# Patient Record
Sex: FEMALE | Race: BLACK OR AFRICAN AMERICAN | NOT HISPANIC OR LATINO | Employment: FULL TIME | URBAN - METROPOLITAN AREA
[De-identification: names, ages, dates, MRNs, and addresses within clinical notes are randomized per-mention and may not be internally consistent; named-entity substitution may affect disease eponyms.]

---

## 2018-08-13 ENCOUNTER — OFFICE VISIT (OUTPATIENT)
Dept: URGENT CARE | Facility: CLINIC | Age: 28
End: 2018-08-13
Payer: MEDICAID

## 2018-08-13 VITALS
SYSTOLIC BLOOD PRESSURE: 121 MMHG | DIASTOLIC BLOOD PRESSURE: 64 MMHG | WEIGHT: 149 LBS | HEART RATE: 90 BPM | BODY MASS INDEX: 25.44 KG/M2 | RESPIRATION RATE: 16 BRPM | OXYGEN SATURATION: 98 % | TEMPERATURE: 98 F | HEIGHT: 64 IN

## 2018-08-13 DIAGNOSIS — R30.0 DYSURIA: Primary | ICD-10-CM

## 2018-08-13 DIAGNOSIS — N30.00 ACUTE CYSTITIS WITHOUT HEMATURIA: ICD-10-CM

## 2018-08-13 LAB
B-HCG UR QL: NEGATIVE
BILIRUB UR QL STRIP: NEGATIVE
CTP QC/QA: YES
GLUCOSE UR QL STRIP: NEGATIVE
KETONES UR QL STRIP: NEGATIVE
LEUKOCYTE ESTERASE UR QL STRIP: POSITIVE
PH, POC UA: 5.5 (ref 5–8)
POC BLOOD, URINE: POSITIVE
POC NITRATES, URINE: NEGATIVE
PROT UR QL STRIP: POSITIVE
SP GR UR STRIP: 1.02 (ref 1–1.03)
UROBILINOGEN UR STRIP-ACNC: NORMAL (ref 0.1–1.1)

## 2018-08-13 PROCEDURE — 99203 OFFICE O/P NEW LOW 30 MIN: CPT | Mod: 25,S$GLB,, | Performed by: NURSE PRACTITIONER

## 2018-08-13 PROCEDURE — 81003 URINALYSIS AUTO W/O SCOPE: CPT | Mod: QW,S$GLB,, | Performed by: NURSE PRACTITIONER

## 2018-08-13 PROCEDURE — 81025 URINE PREGNANCY TEST: CPT | Mod: S$GLB,,, | Performed by: NURSE PRACTITIONER

## 2018-08-13 RX ORDER — NITROFURANTOIN 25; 75 MG/1; MG/1
100 CAPSULE ORAL 2 TIMES DAILY
Qty: 10 CAPSULE | Refills: 0 | Status: SHIPPED | OUTPATIENT
Start: 2018-08-13 | End: 2018-08-18

## 2018-08-13 RX ORDER — PHENAZOPYRIDINE HYDROCHLORIDE 100 MG/1
100 TABLET, FILM COATED ORAL 3 TIMES DAILY PRN
Qty: 20 TABLET | Refills: 0 | Status: SHIPPED | OUTPATIENT
Start: 2018-08-13 | End: 2019-08-13

## 2018-08-13 NOTE — PROGRESS NOTES
"Subjective:       Patient ID: Lorin Craig is a 27 y.o. female.    Vitals:  height is 5' 4" (1.626 m) and weight is 67.6 kg (149 lb). Her oral temperature is 97.6 °F (36.4 °C). Her blood pressure is 121/64 and her pulse is 90. Her respiration is 16 and oxygen saturation is 98%.     Chief Complaint: Dysuria    Patient states her symptoms started on 8/7/2018.  Hx of uti but has not has not had one for years         Dysuria    This is a new problem. The current episode started in the past 7 days. The problem occurs every urination. The problem has been gradually worsening. The quality of the pain is described as burning and aching. The pain is at a severity of 9/10. The pain is severe. There has been no fever. She is sexually active. There is no history of pyelonephritis. Associated symptoms include frequency, nausea and urgency. Pertinent negatives include no chills, hematuria or vomiting. She has tried nothing for the symptoms. There is no history of kidney stones or recurrent UTIs.     Review of Systems   Constitution: Negative for chills and fever.   Skin: Negative for itching.   Musculoskeletal: Negative for back pain.   Gastrointestinal: Positive for abdominal pain and nausea. Negative for vomiting.   Genitourinary: Positive for dysuria, frequency, incomplete emptying and urgency. Negative for genital sores, hematuria, missed menses and non-menstrual bleeding.       Objective:      Physical Exam   Constitutional: She is oriented to person, place, and time. She appears well-developed and well-nourished.   HENT:   Head: Normocephalic and atraumatic.   Right Ear: External ear normal.   Left Ear: External ear normal.   Nose: Nose normal.   Eyes: Lids are normal.   Neck: Trachea normal, normal range of motion and phonation normal. Neck supple.   Cardiovascular: Normal pulses.   Pulmonary/Chest: Effort normal.   Abdominal: Soft. Normal appearance and bowel sounds are normal. She exhibits no distension. There is no " tenderness. There is no CVA tenderness.   Neurological: She is alert and oriented to person, place, and time.   Skin: Skin is warm, dry and intact.   Psychiatric: She has a normal mood and affect. Her speech is normal and behavior is normal. Cognition and memory are normal.   Nursing note and vitals reviewed.      Assessment:       1. Dysuria    2. Acute cystitis without hematuria        Plan:         Dysuria  -     POCT Urinalysis, Dipstick, Automated, W/O Scope  -     POCT urine pregnancy    Acute cystitis without hematuria    Other orders  -     nitrofurantoin, macrocrystal-monohydrate, (MACROBID) 100 MG capsule; Take 1 capsule (100 mg total) by mouth 2 (two) times daily. for 5 days  Dispense: 10 capsule; Refill: 0  -     phenazopyridine (PYRIDIUM) 100 MG tablet; Take 1 tablet (100 mg total) by mouth 3 (three) times daily as needed for Pain.  Dispense: 20 tablet; Refill: 0      Patient Instructions   UTI Relief   · Increase water intake, decrease sodas, tea, coffee, energy drinks. Cranberry products are thought to protect against UTI by inhibiting bacterial growth and adhesion to the bladder.   · Tylenol (acetaminophen) and/or NSAIDS (motrin, ibuprofen)   · Timed voiding every 2-3 hours ( void every 2-3 hours even if you do not feel the urge)  · OTC AZO/pyridium if symptoms are persistent - this will turn your urine orange.  · Sitting in the tub of warm water 20-30 minutes 3-4x/day can help with symptom relief   · Avoid tight fitting cloths, douching, tampon use  · Wipe front to back   · Void prior to and after intercourse   · Use probiotics especially with any antibiotic therapy

## 2018-08-14 NOTE — PATIENT INSTRUCTIONS
UTI Relief   · Increase water intake, decrease sodas, tea, coffee, energy drinks. Cranberry products are thought to protect against UTI by inhibiting bacterial growth and adhesion to the bladder.   · Tylenol (acetaminophen) and/or NSAIDS (motrin, ibuprofen)   · Timed voiding every 2-3 hours ( void every 2-3 hours even if you do not feel the urge)  · OTC AZO/pyridium if symptoms are persistent - this will turn your urine orange.  · Sitting in the tub of warm water 20-30 minutes 3-4x/day can help with symptom relief   · Avoid tight fitting cloths, douching, tampon use  · Wipe front to back   · Void prior to and after intercourse   · Use probiotics especially with any antibiotic therapy

## 2018-08-16 ENCOUNTER — TELEPHONE (OUTPATIENT)
Dept: URGENT CARE | Facility: CLINIC | Age: 28
End: 2018-08-16

## 2018-08-20 ENCOUNTER — OFFICE VISIT (OUTPATIENT)
Dept: URGENT CARE | Facility: CLINIC | Age: 28
End: 2018-08-20
Payer: MEDICAID

## 2018-08-20 VITALS
SYSTOLIC BLOOD PRESSURE: 119 MMHG | DIASTOLIC BLOOD PRESSURE: 73 MMHG | TEMPERATURE: 100 F | BODY MASS INDEX: 25.44 KG/M2 | OXYGEN SATURATION: 99 % | WEIGHT: 149 LBS | RESPIRATION RATE: 16 BRPM | HEART RATE: 97 BPM | HEIGHT: 64 IN

## 2018-08-20 DIAGNOSIS — I88.9 CERVICAL ADENITIS: ICD-10-CM

## 2018-08-20 DIAGNOSIS — H65.191 OTHER ACUTE NONSUPPURATIVE OTITIS MEDIA OF RIGHT EAR, RECURRENCE NOT SPECIFIED: Primary | ICD-10-CM

## 2018-08-20 PROCEDURE — 99214 OFFICE O/P EST MOD 30 MIN: CPT | Mod: S$GLB,,, | Performed by: FAMILY MEDICINE

## 2018-08-20 RX ORDER — PSEUDOEPHEDRINE HCL 120 MG/1
120 TABLET, FILM COATED, EXTENDED RELEASE ORAL 2 TIMES DAILY
Qty: 20 TABLET | Refills: 0 | Status: SHIPPED | OUTPATIENT
Start: 2018-08-20 | End: 2018-08-30

## 2018-08-20 RX ORDER — KETOROLAC TROMETHAMINE 30 MG/ML
30 INJECTION, SOLUTION INTRAMUSCULAR; INTRAVENOUS
Status: COMPLETED | OUTPATIENT
Start: 2018-08-20 | End: 2018-08-20

## 2018-08-20 RX ORDER — AMOXICILLIN AND CLAVULANATE POTASSIUM 875; 125 MG/1; MG/1
1 TABLET, FILM COATED ORAL 2 TIMES DAILY
Qty: 20 TABLET | Refills: 0 | Status: SHIPPED | OUTPATIENT
Start: 2018-08-20 | End: 2020-03-25 | Stop reason: CLARIF

## 2018-08-20 RX ADMIN — KETOROLAC TROMETHAMINE 30 MG: 30 INJECTION, SOLUTION INTRAMUSCULAR; INTRAVENOUS at 06:08

## 2018-08-20 NOTE — PROGRESS NOTES
"Subjective:       Patient ID: Lorin Craig is a 27 y.o. female.    Vitals:  height is 5' 4" (1.626 m) and weight is 67.6 kg (149 lb). Her oral temperature is 100.3 °F (37.9 °C). Her blood pressure is 119/73 and her pulse is 97. Her respiration is 16 and oxygen saturation is 99%.     Chief Complaint: Jaw Pain (lower jaw)    Patient states her symptoms started on 08/17/2018. Patient states she is here because she is having lowe jaw pain. Patient states her pain score is a 10/10. Patient states she took Advil for the pain but it is not helping. Patient states her throat is not hurting is not hurting. Patient states she is having some ear pain. Patient states she does not have HX of TMJ. Patient states it hurts to move her jaw or to eat.      Review of Systems   Constitution: Negative for chills and fever.   HENT: Positive for sore throat.    Eyes: Negative for blurred vision.   Cardiovascular: Negative for chest pain.   Respiratory: Negative for shortness of breath.    Skin: Negative for rash.   Musculoskeletal: Negative for back pain and joint pain.   Gastrointestinal: Negative for abdominal pain, diarrhea, nausea and vomiting.   Neurological: Negative for headaches.   Psychiatric/Behavioral: The patient is not nervous/anxious.        Objective:      Physical Exam   Constitutional: She is oriented to person, place, and time. She appears well-developed and well-nourished.   HENT:   Head: Normocephalic and atraumatic. Head is without abrasion, without contusion and without laceration.   Right Ear: External ear normal. Tympanic membrane is erythematous.   Left Ear: External ear normal. A middle ear effusion is present.   Nose: Right sinus exhibits maxillary sinus tenderness. Right sinus exhibits no frontal sinus tenderness. Left sinus exhibits maxillary sinus tenderness. Left sinus exhibits no frontal sinus tenderness.   Mouth/Throat: Posterior oropharyngeal erythema present.   Eyes: Conjunctivae and lids are normal. " Pupils are equal, round, and reactive to light.   Neck: Trachea normal, full passive range of motion without pain and phonation normal. Neck supple.   Cardiovascular: Normal rate, regular rhythm and normal heart sounds.   Pulmonary/Chest: Effort normal and breath sounds normal. No stridor. No respiratory distress. She has no decreased breath sounds. She has no wheezes. She has no rhonchi. She has no rales.   Musculoskeletal: Normal range of motion.   Lymphadenopathy:        Head (right side): Submandibular and posterior auricular adenopathy present. No submental adenopathy present.        Head (left side): Submandibular adenopathy present. No submental adenopathy present.     She has cervical adenopathy.        Right cervical: Superficial cervical adenopathy present.        Left cervical: Superficial cervical adenopathy present.   Patient has diffuse lymph node swelling and tenderness right side greater than left in cervical and submandibular areas.     Neurological: She is alert and oriented to person, place, and time.   Skin: Skin is warm, dry and intact. Capillary refill takes less than 2 seconds. No abrasion, no bruising, no burn, no ecchymosis, no laceration, no lesion and no rash noted. No erythema.   Psychiatric: She has a normal mood and affect. Her speech is normal and behavior is normal. Judgment and thought content normal. Cognition and memory are normal.   Nursing note and vitals reviewed.      Assessment:       1. Other acute nonsuppurative otitis media of right ear, recurrence not specified    2. Cervical adenitis        Plan:     Advised to use heating pad to her neck and under her chin.       Other acute nonsuppurative otitis media of right ear, recurrence not specified    Cervical adenitis    Other orders  -     ketorolac injection 30 mg; Inject 1 mL (30 mg total) into the muscle one time.  -     pseudoephedrine (SUDAFED 12 HOUR) 120 mg 12 hr tablet; Take 1 tablet (120 mg total) by mouth 2 (two)  times daily. for 10 days  Dispense: 20 tablet; Refill: 0  -     amoxicillin-clavulanate 875-125mg (AUGMENTIN) 875-125 mg per tablet; Take 1 tablet by mouth 2 (two) times daily.  Dispense: 20 tablet; Refill: 0        Follow Up Comments   Make sure that you follow up with your primary care doctor in the next 2-5 days if needed .  Return to the Urgent Care if signs or symptoms change and certainly if you have worsening symptoms go to the nearest emergency department for further evaluation.

## 2018-08-20 NOTE — PATIENT INSTRUCTIONS
Local Lymph Node Infection, Antibiotic Treatment    You have a bacterial infection of a lymph node. The lymph nodes are part of your immune system. They are found under the jaw and along the side of the neck, in the armpits, and in the groin. An infection or inflammation in nearby tissues causes the lymph nodes to swell and become tender.  When a bacterial infection occurs in the lymph node, it becomes very painful. The nearby skin gets red and warm. You may also have a fever.  Antibiotics and hot compresses are used to treat this infection. The pain and redness will get better over the next 7 to 10 days. Swelling may take several months to go away.  Sometimes an abscess (with pus) forms inside the lymph node. If this happens, antibiotics may not be enough to cure the infection. Minor surgery may be needed to drain the pus.  Home care  Follow these guidelines when caring for yourself at home:  · Take all of the antibiotic medicine exactly as prescribed until it is gone. Be careful not to miss any doses, especially during the first few days.  · Make a hot compress by running hot water over a face cloth. Apply it to the sore area until the cloth cools off. Repeat this for 20 minutes. Use the hot compress 3 times a day for the first 3 days, or until the pain and redness begin to get better. The heat will increase the blood flow to the area and speed the healing process.  · You may use acetaminophen or ibuprofen to control pain and fever, unless another medicine was prescribed for this. Dont use ibuprofen in children under 6 months of age. If you have chronic liver or kidney disease, talk with your healthcare provider before using these medicines. Also talk with your provider if youve had a stomach ulcer or GI bleeding. Dont give aspirin to anyone under 18 years of age who is ill with a fever. It may cause severe liver damage.  Follow-up care  Follow up with your healthcare provider, or as advised, after you finish  the antibiotics.  When to seek medical advice  Call your healthcare provider right away if any of these occur:  · Redness, swelling or pain in the lymph node gets worse  · The lymph node gets bigger, becomes soft in the middle, or doesnt seem to be getting better after youve taken the antibiotics for 2 days (48 hours)  · Pus or fluid drains from the lymph node  · You have difficulty breathing or swallowing  Also call your provider right away if you have a fever, or your childs provider if:  · Your child is younger than 12 weeks and has a fever of 100.4°F (38°C) or higher. Your baby may need to be seen by his or her healthcare provider.  · Your child is of any age and has repeated fevers above 104°F (40°C).  · Your child is younger than 2 years old and his or her fever continues for more than 24 hours. Or your child is 2 years old or older and his or her fever continues for more than 3 days.  Date Last Reviewed: 2/17/2015 © 2000-2017 [employer]. [address] [place] [address]. All rights reserved. This information is not intended as a substitute for professional medical care. Always follow your healthcare professional's instructions.        Middle Ear Infection (Adult)  You have an infection of the middle ear, the space behind the eardrum. This is also called acute otitis media (AOM). Sometimes it is caused by the common cold. This is because congestion can block the internal passage (eustachian tube) that drains fluid from the middle ear. When the middle ear fills with fluid, bacteria can grow there and cause an infection. Oral antibiotics are used to treat this illness, not ear drops. Symptoms usually start to improve within 1 to 2 days of treatment.    Home care  The following are general care guidelines:  · Finish all of the antibiotic medicine given, even though you may feel better after the first few days.  · You may use over-the-counter medicine, such as acetaminophen or  ibuprofen, to control pain and fever, unless something else was prescribed. If you have chronic liver or kidney disease or have ever had a stomach ulcer or gastrointestinal bleeding, talk with your healthcare provider before using these medicines. Do not give aspirin to anyone under 18 years of age who has a fever. It may cause severe illness or death.  Follow-up care  Follow up with your healthcare provider, or as advised, in 2 weeks if all symptoms have not gotten better, or if hearing doesn't go back to normal within 1 month.  When to seek medical advice  Call your healthcare provider right away if any of these occur:  · Ear pain gets worse or does not improve after 3 days of treatment  · Unusual drowsiness or confusion  · Neck pain, stiff neck, or headache  · Fluid or blood draining from the ear canal  · Fever of 100.4°F (38°C) or as advised   · Seizure  Date Last Reviewed: 6/1/2016 © 2000-2017 [employer]. [address] [place] [address]. All rights reserved. This information is not intended as a substitute for professional medical care. Always follow your healthcare professional's instructions.      Lorin was seen today for jaw pain.    Diagnoses and all orders for this visit:    Other acute nonsuppurative otitis media of right ear, recurrence not specified    Cervical adenitis    Other orders  -     ketorolac injection 30 mg; Inject 1 mL (30 mg total) into the muscle one time.  -     pseudoephedrine (SUDAFED 12 HOUR) 120 mg 12 hr tablet; Take 1 tablet (120 mg total) by mouth 2 (two) times daily. for 10 days  -     amoxicillin-clavulanate 875-125mg (AUGMENTIN) 875-125 mg per tablet; Take 1 tablet by mouth 2 (two) times daily.            Follow Up Comments   Make sure that you follow up with your primary care doctor in the next 2-5 days if needed .  Return to the Urgent Care if signs or symptoms change and certainly if you have worsening symptoms go to the nearest emergency department  for further evaluation.     Verónica Meeks MD

## 2018-08-30 ENCOUNTER — OFFICE VISIT (OUTPATIENT)
Dept: URGENT CARE | Facility: CLINIC | Age: 28
End: 2018-08-30
Payer: MEDICAID

## 2018-08-30 VITALS
DIASTOLIC BLOOD PRESSURE: 80 MMHG | SYSTOLIC BLOOD PRESSURE: 118 MMHG | HEART RATE: 84 BPM | WEIGHT: 149 LBS | TEMPERATURE: 98 F | RESPIRATION RATE: 18 BRPM | BODY MASS INDEX: 25.44 KG/M2 | OXYGEN SATURATION: 98 % | HEIGHT: 64 IN

## 2018-08-30 DIAGNOSIS — H57.89 REDNESS OF BOTH EYES: Primary | ICD-10-CM

## 2018-08-30 PROCEDURE — 99213 OFFICE O/P EST LOW 20 MIN: CPT | Mod: S$GLB,,, | Performed by: FAMILY MEDICINE

## 2018-08-30 RX ORDER — PREDNISOLONE ACETATE 10 MG/ML
1 SUSPENSION/ DROPS OPHTHALMIC 4 TIMES DAILY
Qty: 1 BOTTLE | Refills: 0 | Status: SHIPPED | OUTPATIENT
Start: 2018-08-30 | End: 2018-09-09

## 2018-08-30 NOTE — PROGRESS NOTES
"Subjective:       Patient ID: Lorin Craig is a 27 y.o. female.    Vitals:  height is 5' 4" (1.626 m) and weight is 67.6 kg (149 lb). Her temperature is 98.4 °F (36.9 °C). Her blood pressure is 118/80 and her pulse is 84. Her respiration is 18 and oxygen saturation is 98%.     Chief Complaint: Eye Problem    C/o itchy and red eyes, recalls removing make up of eye using vaseline, no FB   no hx of contact lenses. Denies any other allergy SX      Eye Problem    Both eyes are affected.This is a new problem. The current episode started in the past 7 days. The problem occurs constantly. The problem has been unchanged. There was no injury mechanism. The pain is at a severity of 1/10. The pain is mild. There is no known exposure to pink eye. She does not wear contacts. Associated symptoms include eye redness. Pertinent negatives include no blurred vision, fever, nausea, photophobia or vomiting. She has tried eye drops for the symptoms. The treatment provided no relief.     Review of Systems   Constitution: Negative for chills and fever.   HENT: Negative for congestion.    Eyes: Positive for pain and redness. Negative for blurred vision and photophobia.   Gastrointestinal: Negative for nausea and vomiting.   Neurological: Negative for headaches.       Objective:      Physical Exam   Constitutional: She is oriented to person, place, and time. She appears well-developed and well-nourished. She is cooperative.  Non-toxic appearance. She does not appear ill. No distress.   HENT:   Head: Normocephalic and atraumatic.   Right Ear: Hearing, tympanic membrane, external ear and ear canal normal.   Left Ear: Hearing, tympanic membrane, external ear and ear canal normal.   Nose: Nose normal. No mucosal edema, rhinorrhea or nasal deformity. No epistaxis. Right sinus exhibits no maxillary sinus tenderness and no frontal sinus tenderness. Left sinus exhibits no maxillary sinus tenderness and no frontal sinus tenderness. "   Mouth/Throat: Uvula is midline, oropharynx is clear and moist and mucous membranes are normal. No trismus in the jaw. Normal dentition. No uvula swelling. No oropharyngeal exudate or posterior oropharyngeal erythema.   Eyes: Conjunctivae and lids are normal. Right eye exhibits no discharge. Left eye exhibits no discharge. No scleral icterus.   Conjunctivae erythematous bilaterally, no swelling  No FB   Neck: Trachea normal, normal range of motion, full passive range of motion without pain and phonation normal. Neck supple.   Cardiovascular: Normal rate, regular rhythm, normal heart sounds, intact distal pulses and normal pulses.   Pulmonary/Chest: Effort normal and breath sounds normal. No respiratory distress.   Abdominal: Soft. Normal appearance and bowel sounds are normal. She exhibits no distension, no pulsatile midline mass and no mass. There is no tenderness.   Musculoskeletal: Normal range of motion. She exhibits no edema or deformity.   Neurological: She is alert and oriented to person, place, and time. She exhibits normal muscle tone. Coordination normal.   Skin: Skin is warm, dry and intact. She is not diaphoretic. No pallor.   Psychiatric: She has a normal mood and affect. Her speech is normal and behavior is normal. Judgment and thought content normal. Cognition and memory are normal.   Nursing note and vitals reviewed.      Assessment:       No diagnosis found.    Plan:         There are no diagnoses linked to this encounter.

## 2019-06-04 ENCOUNTER — TELEPHONE (OUTPATIENT)
Dept: OTOLARYNGOLOGY | Facility: CLINIC | Age: 29
End: 2019-06-04

## 2019-06-07 ENCOUNTER — OFFICE VISIT (OUTPATIENT)
Dept: OTOLARYNGOLOGY | Facility: CLINIC | Age: 29
End: 2019-06-07
Payer: MEDICAID

## 2019-06-07 VITALS
DIASTOLIC BLOOD PRESSURE: 68 MMHG | WEIGHT: 147.69 LBS | BODY MASS INDEX: 25.35 KG/M2 | HEART RATE: 69 BPM | SYSTOLIC BLOOD PRESSURE: 104 MMHG

## 2019-06-07 DIAGNOSIS — H69.93 DYSFUNCTION OF BOTH EUSTACHIAN TUBES: Primary | ICD-10-CM

## 2019-06-07 PROCEDURE — 99202 PR OFFICE/OUTPT VISIT, NEW, LEVL II, 15-29 MIN: ICD-10-PCS | Mod: S$PBB,,, | Performed by: NURSE PRACTITIONER

## 2019-06-07 PROCEDURE — 99999 PR PBB SHADOW E&M-EST. PATIENT-LVL II: CPT | Mod: PBBFAC,,, | Performed by: NURSE PRACTITIONER

## 2019-06-07 PROCEDURE — 99999 PR PBB SHADOW E&M-EST. PATIENT-LVL II: ICD-10-PCS | Mod: PBBFAC,,, | Performed by: NURSE PRACTITIONER

## 2019-06-07 PROCEDURE — 99212 OFFICE O/P EST SF 10 MIN: CPT | Mod: PBBFAC | Performed by: NURSE PRACTITIONER

## 2019-06-07 PROCEDURE — 99202 OFFICE O/P NEW SF 15 MIN: CPT | Mod: S$PBB,,, | Performed by: NURSE PRACTITIONER

## 2019-06-07 RX ORDER — FLUTICASONE PROPIONATE 50 MCG
2 SPRAY, SUSPENSION (ML) NASAL DAILY
Qty: 1 BOTTLE | Refills: 5 | Status: SHIPPED | OUTPATIENT
Start: 2019-06-07 | End: 2019-07-07

## 2019-06-07 NOTE — PROGRESS NOTES
Subjective:      Lorin Craig is a 28 y.o. female who was self-referred for ear pain.    Ms. Craig presents to clinic today with intermittent ear pain that fluctuates between both ears for the past several months. Associated symptoms includes ear pressure that comes and goes. She denies any ear drainage, hearing change, nasal congestion, rhinorrhea, sinus pressure, itchy eyes or nose or sneezing. She denies any history of allergy. She has not tried any medication. She denies any ear or sinus surgery or history of ear infections.      Past Medical History  She has a past medical history of Asthma.    Past Surgical History  She has a past surgical history that includes cyst neck.    Family History  Her family history includes Breast cancer (age of onset: 70) in her paternal grandmother; Diabetes in her maternal grandfather; No Known Problems in her brother, brother, father, mother, sister, and sister.    Social History  She reports that she has never smoked. She has never used smokeless tobacco. She reports that she drinks alcohol. She reports that she does not use drugs.    Allergies  She is allergic to no known drug allergies.    Medications  She has a current medication list which includes the following prescription(s): amoxicillin-clavulanate 875-125mg, fluticasone propionate, l norgest/e.estradiol-e.estrad, and phenazopyridine.    Review of Systems   Constitutional: Negative for chills, fatigue and fever.   HENT: Positive for ear pain. Negative for congestion, facial swelling, nosebleeds, postnasal drip, rhinorrhea, sinus pressure, sinus pain, sneezing, sore throat and tinnitus.    Eyes: Negative for photophobia, redness, itching and visual disturbance.   Respiratory: Negative for apnea, cough, shortness of breath, wheezing and stridor.    Cardiovascular: Negative for chest pain and palpitations.   Gastrointestinal: Negative for diarrhea, nausea and vomiting.   Endocrine: Negative.    Genitourinary:  Negative for decreased urine volume, dysuria and frequency.   Musculoskeletal: Negative for arthralgias, myalgias and neck stiffness.   Skin: Negative for rash and wound.   Allergic/Immunologic: Negative for environmental allergies, food allergies and immunocompromised state.   Neurological: Negative for dizziness, syncope, weakness, light-headedness and headaches.   Hematological: Negative for adenopathy. Does not bruise/bleed easily.   Psychiatric/Behavioral: Negative for confusion, decreased concentration and sleep disturbance.          Objective:     /68   Pulse 69   Wt 67 kg (147 lb 11.3 oz)   BMI 25.35 kg/m²      Constitutional:   She is oriented to person, place, and time. Vital signs are normal. She appears well-developed and well-nourished. She appears alert. Normal speech.      Head:  Normocephalic and atraumatic.     Ears:    Right Ear: No lacerations. No drainage, swelling or tenderness. No foreign bodies. No mastoid tenderness. Tympanic membrane is not injected, not scarred, not perforated, not erythematous, not retracted and not bulging. Tympanic membrane mobility is normal. No middle ear effusion. No hemotympanum.   Left Ear: No lacerations. No drainage, swelling or tenderness. No foreign bodies. No mastoid tenderness. Tympanic membrane is perforated. Tympanic membrane is not injected, not scarred, not erythematous, not retracted and not bulging. Tympanic membrane mobility is normal.  No middle ear effusion. No hemotympanum.     Nose:  No mucosal edema, rhinorrhea, nose lacerations, sinus tenderness, septal deviation, nasal septal hematoma or polyps. No epistaxis.  No foreign bodies. Right sinus exhibits no maxillary sinus tenderness and no frontal sinus tenderness. Left sinus exhibits no maxillary sinus tenderness and no frontal sinus tenderness.     Mouth/Throat  Oropharynx clear and moist without lesions or asymmetry, normal uvula midline and lips, teeth, and gums normal. No uvula  swelling, oral lesions, trismus, mucous membrane lesions or xerostomia. No oropharyngeal exudate, posterior oropharyngeal edema or posterior oropharyngeal erythema.     Neck:  Neck normal without thyromegaly masses, asymmetry, normal tracheal structure, crepitus, and tenderness and no adenopathy.     Cardiovascular:   Normal heart sounds and normal pulses.      Pulmonary/Chest:   Effort normal and breath sounds normal.     Psychiatric:   She has a normal mood and affect. Her speech is normal and behavior is normal.     Neurological:   She is alert and oriented to person, place, and time. No cranial nerve deficit.     Skin:   No abrasions, lacerations, lesions, or rashes.       Procedure    None      Data Reviewed    WBC (K/uL)   Date Value   08/03/2010 12.2 (H)     Platelets (K/uL)   Date Value   08/03/2010 145 (L)      No results found for: CREATININE  No results found for: TSH  No results found for: GLU  No results found for: HGBA1C       Assessment:     1. Dysfunction of both eustachian tubes         Plan:     I had a long discussion with the patient regarding her condition and the further workup and management options.    Lorin was seen today for cerumen impaction and otalgia.    Diagnoses and all orders for this visit:    Dysfunction of both eustachian tubes  -     fluticasone propionate (FLONASE) 50 mcg/actuation nasal spray; 2 sprays (100 mcg total) by Each Nare route once daily.    RTC if symptoms do not improve in a month.

## 2019-06-21 ENCOUNTER — TELEPHONE (OUTPATIENT)
Dept: OTOLARYNGOLOGY | Facility: CLINIC | Age: 29
End: 2019-06-21

## 2019-06-21 NOTE — TELEPHONE ENCOUNTER
----- Message from Roxana Jean Baptiste sent at 6/21/2019  9:17 AM CDT -----  Contact: pt   Needs Advice    Reason for call: Pt prescription was sent to the wrong pharmacy. Pt would like prescription sent to  [facility]/pharmacy #[id number] - [place] - [address][phone] (Phone)  [phone] (Fax). Pt does not live on the [facility].        Communication Preference:Please give pt a call to confirm that her prescription was sent to [facility] on [address] at [phone]      Additional Information:fluticasone propionate (FLONASE) 50 mcg/actuation nasal spray 1 Bottle 5 6/7/2019 7/7/2019   Sig - Route: 2 sprays (100 mcg total) by Each Nare route once daily. - Each Nare   Sent to pharmacy as: fluticasone propionate (FLONASE) 50 mcg/actuation nasal spray   E-Prescribing Status: Receipt confirmed by pharmacy (6/7/2019  2:28 PM CDT)     Original prescription was sent to [id number] [place] - [address] [phone] (Phone)  [phone] (Fax)

## 2019-10-04 ENCOUNTER — OCCUPATIONAL HEALTH (OUTPATIENT)
Dept: URGENT CARE | Facility: CLINIC | Age: 29
End: 2019-10-04

## 2019-10-04 DIAGNOSIS — Z02.1 PHYSICAL EXAM, PRE-EMPLOYMENT: Primary | ICD-10-CM

## 2019-10-04 LAB — POC BREATH ALCOHOL: NEGATIVE

## 2019-10-04 PROCEDURE — 80305 OOH NON-DOT DRUG SCREEN: ICD-10-PCS | Mod: S$GLB,,, | Performed by: PHYSICIAN ASSISTANT

## 2019-10-04 PROCEDURE — 80305 DRUG TEST PRSMV DIR OPT OBS: CPT | Mod: S$GLB,,, | Performed by: PHYSICIAN ASSISTANT

## 2019-10-04 PROCEDURE — 82075 POCT ALCOHOL BREATH TEST: ICD-10-PCS | Mod: S$GLB,,, | Performed by: PHYSICIAN ASSISTANT

## 2019-10-04 PROCEDURE — 82075 ASSAY OF BREATH ETHANOL: CPT | Mod: S$GLB,,, | Performed by: PHYSICIAN ASSISTANT

## 2019-10-04 PROCEDURE — 99499 UNLISTED E&M SERVICE: CPT | Mod: S$GLB,,, | Performed by: PHYSICIAN ASSISTANT

## 2019-10-04 PROCEDURE — 99499 PHYSICAL, BASIC COMPLEXITY: ICD-10-PCS | Mod: S$GLB,,, | Performed by: PHYSICIAN ASSISTANT

## 2020-03-25 ENCOUNTER — HOSPITAL ENCOUNTER (EMERGENCY)
Facility: HOSPITAL | Age: 30
Discharge: HOME | End: 2020-03-25
Attending: EMERGENCY MEDICINE
Payer: MEDICAID

## 2020-03-25 VITALS
WEIGHT: 146 LBS | HEART RATE: 84 BPM | SYSTOLIC BLOOD PRESSURE: 103 MMHG | TEMPERATURE: 100 F | HEIGHT: 64 IN | RESPIRATION RATE: 16 BRPM | BODY MASS INDEX: 24.92 KG/M2 | OXYGEN SATURATION: 100 % | DIASTOLIC BLOOD PRESSURE: 59 MMHG

## 2020-03-25 DIAGNOSIS — J98.01 BRONCHOSPASM, ACUTE: ICD-10-CM

## 2020-03-25 DIAGNOSIS — R07.9 CHEST PAIN: ICD-10-CM

## 2020-03-25 DIAGNOSIS — N30.01 ACUTE CYSTITIS WITH HEMATURIA: ICD-10-CM

## 2020-03-25 DIAGNOSIS — J06.9 VIRAL URI: ICD-10-CM

## 2020-03-25 DIAGNOSIS — R05.9 COUGH: ICD-10-CM

## 2020-03-25 DIAGNOSIS — Z20.822 SUSPECTED COVID-19 VIRUS INFECTION: Primary | ICD-10-CM

## 2020-03-25 DIAGNOSIS — R50.9 FEVER, UNSPECIFIED FEVER CAUSE: ICD-10-CM

## 2020-03-25 LAB
B-HCG UR QL: NEGATIVE
BACTERIA #/AREA URNS AUTO: ABNORMAL /HPF
BILIRUB UR QL STRIP: NEGATIVE
CLARITY UR REFRACT.AUTO: CLEAR
COLOR UR AUTO: ABNORMAL
CTP QC/QA: YES
CTP QC/QA: YES
GLUCOSE UR QL STRIP: NEGATIVE
HGB UR QL STRIP: NEGATIVE
KETONES UR QL STRIP: NEGATIVE
LEUKOCYTE ESTERASE UR QL STRIP: ABNORMAL
MICROSCOPIC COMMENT: ABNORMAL
NITRITE UR QL STRIP: NEGATIVE
PH UR STRIP: 7 [PH] (ref 5–8)
POC MOLECULAR INFLUENZA A AGN: NEGATIVE
POC MOLECULAR INFLUENZA B AGN: NEGATIVE
PROT UR QL STRIP: NEGATIVE
RBC #/AREA URNS AUTO: 1 /HPF (ref 0–4)
SP GR UR STRIP: 1 (ref 1–1.03)
SQUAMOUS #/AREA URNS AUTO: 2 /HPF
URN SPEC COLLECT METH UR: ABNORMAL
WBC #/AREA URNS AUTO: 7 /HPF (ref 0–5)

## 2020-03-25 PROCEDURE — 81001 URINALYSIS AUTO W/SCOPE: CPT

## 2020-03-25 PROCEDURE — 99285 EMERGENCY DEPT VISIT HI MDM: CPT | Mod: ,,, | Performed by: EMERGENCY MEDICINE

## 2020-03-25 PROCEDURE — 99284 EMERGENCY DEPT VISIT MOD MDM: CPT | Mod: 25

## 2020-03-25 PROCEDURE — 87502 INFLUENZA DNA AMP PROBE: CPT

## 2020-03-25 PROCEDURE — 99285 PR EMERGENCY DEPT VISIT,LEVEL V: ICD-10-PCS | Mod: ,,, | Performed by: EMERGENCY MEDICINE

## 2020-03-25 PROCEDURE — 93005 ELECTROCARDIOGRAM TRACING: CPT

## 2020-03-25 PROCEDURE — 81025 URINE PREGNANCY TEST: CPT | Performed by: EMERGENCY MEDICINE

## 2020-03-25 RX ORDER — ALBUTEROL SULFATE 90 UG/1
2 AEROSOL, METERED RESPIRATORY (INHALATION) EVERY 6 HOURS PRN
Qty: 18 G | Refills: 0 | Status: SHIPPED | OUTPATIENT
Start: 2020-03-25 | End: 2021-03-25

## 2020-03-25 RX ORDER — BENZONATATE 100 MG/1
200 CAPSULE ORAL 3 TIMES DAILY PRN
Qty: 30 CAPSULE | Refills: 1 | Status: SHIPPED | OUTPATIENT
Start: 2020-03-25 | End: 2020-04-08

## 2020-03-25 RX ORDER — IBUPROFEN 400 MG/1
800 TABLET ORAL
Status: DISCONTINUED | OUTPATIENT
Start: 2020-03-25 | End: 2020-03-25 | Stop reason: HOSPADM

## 2020-03-25 RX ORDER — KETOROLAC TROMETHAMINE 30 MG/ML
15 INJECTION, SOLUTION INTRAMUSCULAR; INTRAVENOUS
Status: DISCONTINUED | OUTPATIENT
Start: 2020-03-25 | End: 2020-03-25

## 2020-03-25 RX ORDER — DOXYCYCLINE 100 MG/1
100 CAPSULE ORAL 2 TIMES DAILY
Qty: 14 CAPSULE | Refills: 0 | Status: SHIPPED | OUTPATIENT
Start: 2020-03-25 | End: 2020-04-01

## 2020-03-25 RX ORDER — KETOROLAC TROMETHAMINE 30 MG/ML
30 INJECTION, SOLUTION INTRAMUSCULAR; INTRAVENOUS
Status: DISCONTINUED | OUTPATIENT
Start: 2020-03-25 | End: 2020-03-25

## 2020-03-25 NOTE — DISCHARGE INSTRUCTIONS
"Please drink plenty of fluids.  Please get plenty of rest.  Please return here or go to the Emergency Department for any concerns or worsening of condition.  If you were prescribed antibiotics, please take them to completion.  If you were given wait & see antibiotics, please wait 5-7 days before taking them, and only take them if your symptoms have worsened or not improved.  If you do begin taking the antibiotics, please take them to completion.  If you were prescribed a narcotic medication, do not drive or operate heavy equipment or machinery while taking these medications.  If you were given a steroid shot in the clinic and have also been given a prescription for a steroid such as Prednisone or a Medrol Dose Pack, please begin taking them tomorrow.  If you do not have Hypertension or any history of palpitations, it is ok to take over the counter Sudafed or Mucinex D or Allegra-D or Claritin-D or Zyrtec-D.  If you do take one of the above, it is ok to combine that with plain over the counter Mucinex or Allegra or Claritin or Zyrtec.  If for example you are taking Zyrtec -D, you can combine that with Mucinex, but not Mucinex-D.  If you are taking Mucinex-D, you can combine that with plain Allegra or Claritin or Zyrtec.   If you do have Hypertension or palpitations, it is safe to take Coricidin HBP for relief of sinus symptoms.  If not allergic, please take over the counter Tylenol (Acetaminophen) and/or Motrin (Ibuprofen) as directed for control of pain and/or fever.  Please follow up with your primary care doctor or specialist as needed.    If you  smoke, please stop smoking.    To whom it may concern:    Please excuse Chiane "Chiane" Rafa from work for two weeks starting today due to being in a high risk group for COVID-19 infection.    Sincerely,       Adriel To III, MD  03/25/2020     "

## 2020-03-25 NOTE — ED PROVIDER NOTES
Encounter Date: 3/25/2020       History     Chief Complaint   Patient presents with    Fever     body aches, fever since yeaterday, no appetitte, hematuria today      29-year-old female presents emergency department with a approximately 2 day history fever, cough and myalgias.  She also complains of headache.  She denies any congestion sore throat or ear pain.  Denies any nausea vomiting diarrhea.  She states that she noticed blood in her urine today.  She denies any pain associated with that.  She does complain of chest pain that has been present for a week.  She describes it as a tightness across her chest that does not radiate and does not worsen with movement or inspiration or palpation.  There is no radiation or other associated symptoms with that.  She has negative family history of cardiac disease.  She does not have hypertension, hypercholesterolemia, diabetes, and does not smoke.  She said the pain improved when she would take Motrin with the Motrin wear off it would come back.  It has been present 24 hr a day for last 7 days.        Review of patient's allergies indicates:   Allergen Reactions    No known drug allergies      Past Medical History:   Diagnosis Date    Asthma      Past Surgical History:   Procedure Laterality Date    cyst neck       Family History   Problem Relation Age of Onset    Breast cancer Paternal Grandmother 70    Diabetes Maternal Grandfather     No Known Problems Mother     No Known Problems Father     No Known Problems Sister     No Known Problems Brother     No Known Problems Sister     No Known Problems Brother      Social History     Tobacco Use    Smoking status: Never Smoker    Smokeless tobacco: Never Used   Substance Use Topics    Alcohol use: Yes     Comment: occasionally    Drug use: No     Review of Systems   Constitutional: Positive for chills, fatigue and fever. Negative for activity change and appetite change.   HENT: Negative for congestion, ear pain,  postnasal drip, rhinorrhea, sore throat and voice change.    Eyes: Negative for discharge and itching.   Respiratory: Positive for cough and chest tightness. Negative for shortness of breath and wheezing.    Cardiovascular: Positive for chest pain. Negative for palpitations.   Gastrointestinal: Negative for abdominal pain, diarrhea, nausea and vomiting.   Genitourinary: Positive for hematuria. Negative for dysuria.   Musculoskeletal: Positive for back pain, myalgias and neck pain.   Neurological: Negative for dizziness and light-headedness.   Psychiatric/Behavioral: Negative for agitation and confusion.       Physical Exam     Initial Vitals   BP Pulse Resp Temp SpO2   03/25/20 1642 03/25/20 1626 03/25/20 1642 03/25/20 1626 03/25/20 1626   (!) 107/55 90 18 100 °F (37.8 °C) 99 %      MAP       --                Physical Exam    Nursing note and vitals reviewed.  Constitutional: She appears well-developed and well-nourished. She is not diaphoretic. No distress.   HENT:   Head: Normocephalic and atraumatic.   Right Ear: External ear normal.   Left Ear: External ear normal.   Nose: Nose normal.   Mouth/Throat: Oropharynx is clear and moist.   Eyes: Conjunctivae are normal. Right eye exhibits no discharge. Left eye exhibits no discharge.   Neck: Normal range of motion. Neck supple.   Cardiovascular: Normal rate, regular rhythm, normal heart sounds and intact distal pulses.   Pulmonary/Chest: Effort normal. No accessory muscle usage. No respiratory distress. She has wheezes in the right upper field, the right middle field, the left upper field and the left middle field.   Musculoskeletal: Normal range of motion. She exhibits no edema.   Neurological: She is alert and oriented to person, place, and time. She has normal strength.   Skin: Skin is warm and dry. Capillary refill takes less than 2 seconds.   Psychiatric: She has a normal mood and affect. Thought content normal.         ED Course   Procedures  Labs Reviewed    URINALYSIS, REFLEX TO URINE CULTURE - Abnormal; Notable for the following components:       Result Value    Leukocytes, UA 2+ (*)     All other components within normal limits    Narrative:     Preferred Collection Type->Urine, Clean Catch   URINALYSIS MICROSCOPIC - Abnormal; Notable for the following components:    WBC, UA 7 (*)     All other components within normal limits    Narrative:     Preferred Collection Type->Urine, Clean Catch   POCT URINE PREGNANCY   POCT INFLUENZA A/B MOLECULAR     EKG Readings: (Independently Interpreted)   Initial Reading: No STEMI. Rhythm: Normal Sinus Rhythm. ST Segments: Non-Specific ST Segment Depression.       Imaging Results          X-Ray Chest AP Portable (Final result)  Result time 03/25/20 17:50:39    Final result by Carlos Chavis MD (03/25/20 17:50:39)                 Impression:      No acute radiographic abnormality.      Electronically signed by: Carlos Chavis  Date:    03/25/2020  Time:    17:50             Narrative:    EXAMINATION:  XR CHEST AP PORTABLE    CLINICAL HISTORY:  chest pain;    TECHNIQUE:  Single frontal view of the chest was performed.    COMPARISON:  None    FINDINGS:  The lungs are clear, with normal appearance of pulmonary vasculature and no pleural effusion or pneumothorax.    The cardiac silhouette is normal in size. The hilar and mediastinal contours are unremarkable.    Bones are intact.                                 Medical Decision Making:   Differential Diagnosis:   Patient presents with signs and symptoms concerning for acute COVID infection.  Her rapid influenza was negative.  She predominantly has a cough and fever with hematuria.  Her urine is suspicious for acute cystitis and will be treated with doxycycline.  Her chest tightness is most likely related to bronchospasm she has no cardiac risk factors and her EKG is unremarkable after 7 straight days of pain.  Chest x-ray showed no acute infiltrate or changes.  She will be treated as  an outpatient with COVID quarantine precautions.  She has been given a work note for 14 days.  She has been given discharge instructions specifically per respiratory changes to return the emergency department.                                 Clinical Impression:       ICD-10-CM ICD-9-CM   1. Suspected Covid-19 Virus Infection R68.89    2. Chest pain R07.9 786.50   3. Viral URI J06.9 465.9   4. Cough R05 786.2   5. Fever, unspecified fever cause R50.9 780.60   6. Bronchospasm, acute J98.01 519.11   7. Acute cystitis with hematuria N30.01 595.0             ED Disposition Condition    Discharge Stable        ED Prescriptions     Medication Sig Dispense Start Date End Date Auth. Provider    albuterol (PROVENTIL/VENTOLIN HFA) 90 mcg/actuation inhaler Inhale 2 puffs into the lungs every 6 (six) hours as needed for Wheezing. Rescue 18 g 3/25/2020 3/25/2021 Adriel To III, MD    benzonatate (TESSALON PERLES) 100 MG capsule Take 2 capsules (200 mg total) by mouth 3 (three) times daily as needed for Cough. 30 capsule 3/25/2020 4/8/2020 Adriel To III, MD    doxycycline (VIBRAMYCIN) 100 MG Cap Take 1 capsule (100 mg total) by mouth 2 (two) times daily. for 7 days 14 capsule 3/25/2020 4/1/2020 Adriel To III, MD        Follow-up Information    None                                    Adriel To III, MD  03/25/20 5050

## 2021-01-26 ENCOUNTER — CLINICAL SUPPORT (OUTPATIENT)
Dept: REHABILITATION | Facility: OTHER | Age: 31
End: 2021-01-26
Payer: MEDICAID

## 2021-01-26 DIAGNOSIS — M62.89 PELVIC FLOOR TENSION: ICD-10-CM

## 2021-01-26 DIAGNOSIS — N39.8 VOIDING DYSFUNCTION: ICD-10-CM

## 2021-01-26 DIAGNOSIS — K59.09 OTHER CONSTIPATION: ICD-10-CM

## 2021-01-26 DIAGNOSIS — N94.10 DYSPAREUNIA IN FEMALE: ICD-10-CM

## 2021-01-26 PROCEDURE — 97162 PT EVAL MOD COMPLEX 30 MIN: CPT

## 2021-01-26 PROCEDURE — 97112 NEUROMUSCULAR REEDUCATION: CPT

## 2021-05-20 ENCOUNTER — DOCUMENTATION ONLY (OUTPATIENT)
Dept: REHABILITATION | Facility: OTHER | Age: 31
End: 2021-05-20

## 2021-05-25 ENCOUNTER — CLINICAL SUPPORT (OUTPATIENT)
Dept: REHABILITATION | Facility: OTHER | Age: 31
End: 2021-05-25
Payer: MEDICAID

## 2021-05-25 DIAGNOSIS — K59.09 OTHER CONSTIPATION: ICD-10-CM

## 2021-05-25 DIAGNOSIS — N94.10 DYSPAREUNIA IN FEMALE: ICD-10-CM

## 2021-05-25 DIAGNOSIS — N39.8 VOIDING DYSFUNCTION: ICD-10-CM

## 2021-05-25 DIAGNOSIS — M62.89 PELVIC FLOOR TENSION: Primary | ICD-10-CM

## 2021-05-25 PROCEDURE — 97112 NEUROMUSCULAR REEDUCATION: CPT

## 2021-05-25 PROCEDURE — 97110 THERAPEUTIC EXERCISES: CPT

## 2021-07-20 ENCOUNTER — CLINICAL SUPPORT (OUTPATIENT)
Dept: REHABILITATION | Facility: OTHER | Age: 31
End: 2021-07-20
Payer: MEDICAID

## 2021-07-20 DIAGNOSIS — N39.8 VOIDING DYSFUNCTION: ICD-10-CM

## 2021-07-20 DIAGNOSIS — K59.09 OTHER CONSTIPATION: ICD-10-CM

## 2021-07-20 DIAGNOSIS — N94.10 DYSPAREUNIA IN FEMALE: ICD-10-CM

## 2021-07-20 DIAGNOSIS — M62.89 PELVIC FLOOR TENSION: Primary | ICD-10-CM

## 2021-07-20 PROCEDURE — 97110 THERAPEUTIC EXERCISES: CPT

## 2022-01-14 ENCOUNTER — TELEPHONE (OUTPATIENT)
Dept: ORTHOPEDICS | Facility: CLINIC | Age: 32
End: 2022-01-14
Payer: MEDICAID

## 2022-01-14 DIAGNOSIS — M25.561 PAIN IN BOTH KNEES, UNSPECIFIED CHRONICITY: Primary | ICD-10-CM

## 2022-01-14 DIAGNOSIS — M25.562 PAIN IN BOTH KNEES, UNSPECIFIED CHRONICITY: Primary | ICD-10-CM

## 2022-01-14 NOTE — TELEPHONE ENCOUNTER
Spoke with pt. Pt states her knees/thighs and arms are hurting. Advised pt she will need xrays prior to appt. Images ordered and scheduled. All questions answered. Pt verbalized understanding.

## 2022-01-19 ENCOUNTER — OFFICE VISIT (OUTPATIENT)
Dept: ORTHOPEDICS | Facility: CLINIC | Age: 32
End: 2022-01-19
Payer: MEDICAID

## 2022-01-19 VITALS
SYSTOLIC BLOOD PRESSURE: 102 MMHG | HEIGHT: 65 IN | DIASTOLIC BLOOD PRESSURE: 66 MMHG | BODY MASS INDEX: 25.23 KG/M2 | HEART RATE: 75 BPM | WEIGHT: 151.44 LBS

## 2022-01-19 DIAGNOSIS — M79.662 PAIN IN BOTH LOWER LEGS: Primary | ICD-10-CM

## 2022-01-19 DIAGNOSIS — M79.661 PAIN IN BOTH LOWER LEGS: Primary | ICD-10-CM

## 2022-01-19 PROCEDURE — 3078F DIAST BP <80 MM HG: CPT | Mod: CPTII,,, | Performed by: ORTHOPAEDIC SURGERY

## 2022-01-19 PROCEDURE — 3008F BODY MASS INDEX DOCD: CPT | Mod: CPTII,,, | Performed by: ORTHOPAEDIC SURGERY

## 2022-01-19 PROCEDURE — 1160F PR REVIEW ALL MEDS BY PRESCRIBER/CLIN PHARMACIST DOCUMENTED: ICD-10-PCS | Mod: CPTII,,, | Performed by: ORTHOPAEDIC SURGERY

## 2022-01-19 PROCEDURE — 1159F PR MEDICATION LIST DOCUMENTED IN MEDICAL RECORD: ICD-10-PCS | Mod: CPTII,,, | Performed by: ORTHOPAEDIC SURGERY

## 2022-01-19 PROCEDURE — 99999 PR PBB SHADOW E&M-EST. PATIENT-LVL III: CPT | Mod: PBBFAC,,, | Performed by: ORTHOPAEDIC SURGERY

## 2022-01-19 PROCEDURE — 1160F RVW MEDS BY RX/DR IN RCRD: CPT | Mod: CPTII,,, | Performed by: ORTHOPAEDIC SURGERY

## 2022-01-19 PROCEDURE — 1159F MED LIST DOCD IN RCRD: CPT | Mod: CPTII,,, | Performed by: ORTHOPAEDIC SURGERY

## 2022-01-19 PROCEDURE — 99203 PR OFFICE/OUTPT VISIT, NEW, LEVL III, 30-44 MIN: ICD-10-PCS | Mod: S$PBB,,, | Performed by: ORTHOPAEDIC SURGERY

## 2022-01-19 PROCEDURE — 99203 OFFICE O/P NEW LOW 30 MIN: CPT | Mod: S$PBB,,, | Performed by: ORTHOPAEDIC SURGERY

## 2022-01-19 PROCEDURE — 99999 PR PBB SHADOW E&M-EST. PATIENT-LVL III: ICD-10-PCS | Mod: PBBFAC,,, | Performed by: ORTHOPAEDIC SURGERY

## 2022-01-19 PROCEDURE — 3008F PR BODY MASS INDEX (BMI) DOCUMENTED: ICD-10-PCS | Mod: CPTII,,, | Performed by: ORTHOPAEDIC SURGERY

## 2022-01-19 PROCEDURE — 3078F PR MOST RECENT DIASTOLIC BLOOD PRESSURE < 80 MM HG: ICD-10-PCS | Mod: CPTII,,, | Performed by: ORTHOPAEDIC SURGERY

## 2022-01-19 PROCEDURE — 99213 OFFICE O/P EST LOW 20 MIN: CPT | Mod: PBBFAC,PN | Performed by: ORTHOPAEDIC SURGERY

## 2022-01-19 PROCEDURE — 3074F SYST BP LT 130 MM HG: CPT | Mod: CPTII,,, | Performed by: ORTHOPAEDIC SURGERY

## 2022-01-19 PROCEDURE — 3074F PR MOST RECENT SYSTOLIC BLOOD PRESSURE < 130 MM HG: ICD-10-PCS | Mod: CPTII,,, | Performed by: ORTHOPAEDIC SURGERY

## 2022-01-19 RX ORDER — ACETAMINOPHEN 500 MG
1000 TABLET ORAL
COMMUNITY
Start: 2021-03-09

## 2022-01-19 RX ORDER — FLUTICASONE PROPIONATE 50 MCG
SPRAY, SUSPENSION (ML) NASAL
COMMUNITY

## 2022-01-19 RX ORDER — DOXYCYCLINE 100 MG/1
CAPSULE ORAL
COMMUNITY

## 2022-01-19 RX ORDER — CETIRIZINE HYDROCHLORIDE 10 MG/1
10 TABLET ORAL DAILY
COMMUNITY
Start: 2021-11-18

## 2022-01-19 RX ORDER — IBUPROFEN 800 MG/1
TABLET ORAL
COMMUNITY

## 2022-01-19 NOTE — PROGRESS NOTES
Subjective:    Patient ID:  Lorin Craig is a 31 y.o. y.o. female who presents for initial visit for Pain of the Right Lower Leg and Pain of the Left Lower Leg      32 yo female reports 2 month h/io bilateral calf pain, insidious onset. Pain worse with first steps in AM and aggravated with prolonged walking, needs to stop and rest to get relief. Notes numbness/tingling left 3rd toe. Endorses bilateral LBP and h/o urinary frequency. Denies swelling, redness, motor weakness, pain with Valsalva maneuvers or constitutional sxs. Takes ibuprofen prn with relief.            Past Medical History:   Diagnosis Date    Asthma         Past Surgical History:   Procedure Laterality Date    cyst neck         Review of patient's allergies indicates:   Allergen Reactions    No known drug allergies           Current Outpatient Medications:     acetaminophen (TYLENOL) 500 MG tablet, Take 1,000 mg by mouth., Disp: , Rfl:     cetirizine (ZYRTEC) 10 MG tablet, Take 10 mg by mouth once daily., Disp: , Rfl:     doxycycline (MONODOX) 100 MG capsule, doxycycline monohydrate 100 mg capsule, Disp: , Rfl:     fluticasone propionate (FLONASE) 50 mcg/actuation nasal spray, fluticasone propionate 50 mcg/actuation nasal spray,suspension, Disp: , Rfl:     ibuprofen (ADVIL,MOTRIN) 800 MG tablet, ibuprofen 800 mg tablet, Disp: , Rfl:     albuterol (PROVENTIL/VENTOLIN HFA) 90 mcg/actuation inhaler, Inhale 2 puffs into the lungs every 6 (six) hours as needed for Wheezing. Rescue, Disp: 18 g, Rfl: 0    Social History     Socioeconomic History    Marital status: Single   Tobacco Use    Smoking status: Never Smoker    Smokeless tobacco: Never Used   Substance and Sexual Activity    Alcohol use: Yes     Comment: occasionally    Drug use: No    Sexual activity: Not Currently     Partners: Male        Family History   Problem Relation Age of Onset    Breast cancer Paternal Grandmother 70    Diabetes Maternal Grandfather     No Known  "Problems Mother     No Known Problems Father     No Known Problems Sister     No Known Problems Brother     No Known Problems Sister     No Known Problems Brother         Review of Systems   Constitutional: Negative for chills and fever.   HENT: Negative for hearing loss.    Eyes: Negative for blurred vision.   Respiratory: Negative for shortness of breath.    Cardiovascular: Negative for chest pain.   Gastrointestinal: Negative for nausea and vomiting.   Genitourinary: Negative for dysuria.   Musculoskeletal: Negative for myalgias.   Skin: Negative for rash.   Neurological: Negative for speech change and loss of consciousness.   Endo/Heme/Allergies: Does not bruise/bleed easily.   Psychiatric/Behavioral: Negative for depression.        Objective:     /66 (BP Location: Right arm, Patient Position: Sitting, BP Method: Medium (Automatic))   Pulse 75   Ht 5' 5" (1.651 m)   Wt 68.7 kg (151 lb 7.3 oz)   BMI 25.20 kg/m²     Ortho Exam     30 yo female in NAD; alert, oriented x 3    Head: atraumatic  Eyes: EOM are normal. Right eye exhibits no discharge. Left eye exhibits no discharge  Cardiovascular: normal rate    Pulmonary/Chest: effort normal; no respiratory distress  Abdominal: soft    L/S spine: tender bilateral lower paralumbar; pain end range flexion/extension    BLE: no swelling or focal tenderness; motor 5/5; sensation intact LT; DTRs 1+ symmetric knees/ankles; negative tension signs/clonus    Bilateral hip/knee: NT; FROM      Assessment & Plan:      1. Pain in both lower legs, possible referred lumbar etiology +/- gastroc contracture      1.  Findings, diagnosis, treatment options/risks/benefits were reviewed  2.  Continue ibuprofen prn  3.  Maintain judicious activity modification/limitation  4.  Home program gastroc/Godwin stretching exercises   5.  Refer to PM&R for further evaluation/treatment  6.  Follow-up here on prn basis      "